# Patient Record
Sex: FEMALE | Race: WHITE
[De-identification: names, ages, dates, MRNs, and addresses within clinical notes are randomized per-mention and may not be internally consistent; named-entity substitution may affect disease eponyms.]

---

## 2023-01-12 ENCOUNTER — HOSPITAL ENCOUNTER (EMERGENCY)
Dept: HOSPITAL 76 - ED | Age: 83
Discharge: HOME | End: 2023-01-12
Payer: COMMERCIAL

## 2023-01-12 VITALS — DIASTOLIC BLOOD PRESSURE: 94 MMHG | SYSTOLIC BLOOD PRESSURE: 135 MMHG

## 2023-01-12 DIAGNOSIS — M16.11: ICD-10-CM

## 2023-01-12 DIAGNOSIS — M77.32: ICD-10-CM

## 2023-01-12 DIAGNOSIS — M72.2: Primary | ICD-10-CM

## 2023-01-12 PROCEDURE — 73630 X-RAY EXAM OF FOOT: CPT

## 2023-01-12 PROCEDURE — 99283 EMERGENCY DEPT VISIT LOW MDM: CPT

## 2023-01-12 NOTE — XRAY REPORT
PROCEDURE:  Foot 3 View LT

 

INDICATIONS:  pain, worsening, no specific injury

 

TECHNIQUE:  3 views of the foot were acquired.  

 

COMPARISON:  None.

 

FINDINGS:  

 

Bones:  No fractures or dislocations. Small calcaneal spurs. Mild degenerative change in the midfoot.
 Mild to moderate degenerative change in the forefoot. No suspicious bony lesions.  

 

Soft tissues:  No tibiotalar joint effusion.  Achilles tendon appears normal.  

 

IMPRESSION:  

Mild to moderate degenerative change.

 

Reviewed by: Regan Obando MD on 1/12/2023 10:45 AM PST

Approved by: Regan Obando MD on 1/12/2023 10:45 AM [facility]

 

 

Station ID:  [id number]

## 2023-01-12 NOTE — ED PHYSICIAN DOCUMENTATION
History of Present Illness





- Stated complaint


Stated Complaint: FOOT PX





- Chief complaint


Chief Complaint: General





- History obtained from


History obtained from: Patient





- Additonal information


Additional information: 


The patient comes to the emergency department with chief complaints of foot pain

increasingly over the last couple of months.  She has not sought any medical 

care for this previously.  She denies any injury.  The patient states that she 

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 

hip as well, but does not think that she favors that side.  She states that she 

has never had to wear orthotics and has never been diagnosed with any other 

condition of the foot.  The patient states that she was just visiting her 

daughter in [place] and they did do a lot of walking around for a week.  She 

came home a few days ago and last night, noticed that her foot pain was 

especially bad.  She feels the pain most strongly in her heel, on the plantar 

surface.  She is not sure if there is any specific movement that makes the pain 

worse, such as stepping down or pushing off.  She cannot think of anything that 

would have triggered this otherwise.  The patient has not been taking any 

medication for the discomfort.  She has been taking some herbal remedies and 

other supplements but that is it. She does have a prior diagnosis of osteopenia 

and takes a supplement for this, as well.  No other complaints at this time.








Review of Systems


Constitutional: reports: Reviewed and negative


Eyes: reports: Reviewed and negative


Ears: reports: Reviewed and negative


Nose: reports: Reviewed and negative


Throat: reports: Reviewed and negative


Cardiac: reports: Reviewed and negative


Respiratory: reports: Reviewed and negative


GI: reports: Reviewed and negative


: reports: Reviewed and negative


Skin: reports: Reviewed and negative


Musculoskeletal: reports: Extremity pain, Pain with weight bearing


Neurologic: reports: Reviewed and negative


Psychiatric: reports: Reviewed and negative


Endocrine: reports: Reviewed and negative


Immunocompromised: reports: Reviewed and negative





PD PAST MEDICAL HISTORY





- Present Medications


Home Medications: 


                                Ambulatory Orders











 Medication  Instructions  Recorded  Confirmed


 


predniSONE [Deltasone] 10 mg PO [id number] #42 tab 01/12/23 














- Allergies


Allergies/Adverse Reactions: 


                                    Allergies











Allergy/AdvReac Type Severity Reaction Status Date / Time


 


Sulfa (Sulfonamide Allergy  Unknown Verified 01/12/23 09:56





Antibiotics)     














PD ED PE NORMAL





- Vitals


Vital signs reviewed: Yes





- General


General: Alert and oriented X 3, No acute distress, Well developed/nourished





- HEENT


HEENT: Atraumatic, PERRL, EOMI, Moist mucous membranes





- Neck


Neck: Supple, no meningeal sign





- Cardiac


Cardiac: Strong equal pulses





- Respiratory


Respiratory: No respiratory distress





- Derm


Derm: Normal color, Warm and dry, No rash





- Extremities


Extremities: No deformity, No edema, Other (Mild tenderness to palpation along 

the attachments of the plantar fascia.  No other specific or point tenderness.  

No deformity of the foot.  No tenderness over the Achilles attachment.)





- Neuro


Neuro: No motor deficit, No sensory deficit





- Psych


Psych: Normal mood, Normal affect





Results





- Vitals


Vitals: 





                               Vital Signs - 24 hr











  01/12/23





  09:52


 


Temperature 36.5 C


 


Heart Rate 98


 


Respiratory 18





Rate 


 


Blood Pressure 145/86 H


 


O2 Saturation 97








                                     Oxygen











O2 Source                      Room air

















- Rads (name of study)


  ** Left foot x-ray


Radiology: Final report received, See rad report (Bone spurs on the calcaneus; 

mild to moderate degenerative changes in the mid and forefoot.)





PD Medical Decision Making





- ED course


Complexity details: reviewed results, re-evaluated patient, considered 

differential, d/w patient, d/w family


ED course: 


I discussed with the patient that it is very important that she follows up these

 chronic issues with her primary care physician.  I am not clear exactly what is

 causing her foot pain, though given the findings of degenerative changes, 

calcaneal bone spurs, and on examination, tenderness at the fascial attachment 

points, I suspect that some combination of all these is to blame.  Furthermore, 

since the patient is known to have Severe right-sided hip arthritis which 

impacts her gait, the patient may also be stressing her left lower extremity 

more than the right and this could also be a contributor.  I recommended the 

following: We will start a course of steroids to help decrease the inflammation 

and discomfort in the patient's foot.  The patient may also take some ibuprofen 

as well.  I have also recommended an assistive device for ambulation to help 

unburden the foot and the immediate future.  The patient has crutches at home 

and states she can get a walker at the senior center.  Lastly, I have 

recommended the patient follow-up with podiatry to discuss further remedies, 

such as custom fitted orthotics or potentially, physical therapy follow-up to 

work on normalizing her gait so that she is not unduly stressing the left foot. 

 There is no further intervention or work-up that is indicated in the emergency 

department at this time.  We have discussed appropriate use of the emergency 

department and the need for follow-up with her primary doctor.








Departure





- Departure


Disposition: 01 Home, Self Care


Clinical Impression: 


 Plantar fasciitis of left foot, Bone spur of left foot





Osteoarthritis of foot, left


Qualifiers:


 Osteoarthritis type: unspecified Qualified Code(s): M19.072 - Primary 

osteoarthritis, left ankle and foot





Condition: Stable


Instructions:  Heel Pain, Plantar Fasciitis, Toe Extension


Follow-Up: 


Raj Recio DPM [Physician No Access] - 


Prescriptions: 


predniSONE [Deltasone] 10 mg PO [id number] #42 tab


Comments: 


Your x-ray shows some arthritis in your foot and bone spurs on your heel.  These

are most likely contributing to some of your discomfort.  Additionally, you have

some tenderness at the attachment points of your plantar fascia, the top of band

of soft tissue that helps stabilize your foot at the sole.  This can sometimes 

become inflamed as well, particularly in the area of the arches.  Lastly, given 

the chronic arthritic issues you have in your right hip, it is probable that you

are over burdening your left leg and foot somewhat and this could also be 

contributing.  Given that this is a chronic issue and not related to any injury,

and is likely to persist to a certain degree, it is very important that you set 

up outpatient evaluation and follow-up for this.  We would recommend following 

up with one of the podiatrist in [facility], as they are the most specialized in area 

of feet and would be best able to advise you as to orthotics or other 

interventions that may be helpful.  In the meantime, you may use crutches or a 

walker to help when you are walking to unburden that left foot.  Additionally, 

we have started you on a steroid course.  You have been given first dose here in

the emergency department and the remainder of the prescription has been 

electronically transmitted to [facility] in [place].

## 2023-03-18 ENCOUNTER — HOSPITAL ENCOUNTER (OUTPATIENT)
Dept: HOSPITAL 76 - DI.S | Age: 83
Discharge: HOME | End: 2023-03-18
Attending: PHYSICIAN ASSISTANT
Payer: MEDICARE

## 2023-03-18 DIAGNOSIS — S52.501A: Primary | ICD-10-CM

## 2023-03-18 NOTE — XRAY REPORT
PROCEDURE:  Wrist 3 View RT

 

INDICATIONS: RIGHT WRIST PAIN

 

TECHNIQUE:  3 views of the wrist were acquired.  

 

COMPARISON:  None

 

FINDINGS:  

 

Bones: Minimally foreshortened fracture of the distal radius.

 

Soft tissues: Soft tissue swelling over the wrist.

 

IMPRESSION:  

Distal radial fracture with mild foreshortening.

 

Reviewed by: Liam Mata MD on 3/18/2023 12:30 PM [facility]

Approved by: Liam Mata MD on 3/18/2023 12:30 PM [facility]

 

 

Station ID:  [id number]

## 2023-03-20 ENCOUNTER — HOSPITAL ENCOUNTER (EMERGENCY)
Dept: HOSPITAL 76 - ED | Age: 83
Discharge: HOME | End: 2023-03-20
Payer: MEDICARE

## 2023-03-20 ENCOUNTER — HOSPITAL ENCOUNTER (OUTPATIENT)
Dept: HOSPITAL 76 - EMS | Age: 83
Discharge: ANOTHER HOSPITAL | End: 2023-03-20
Payer: MEDICARE

## 2023-03-20 VITALS — SYSTOLIC BLOOD PRESSURE: 130 MMHG | DIASTOLIC BLOOD PRESSURE: 70 MMHG

## 2023-03-20 DIAGNOSIS — R41.0: Primary | ICD-10-CM

## 2023-03-20 DIAGNOSIS — R00.0: ICD-10-CM
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
## 2023-03-20 LAB
ALBUMIN DIAFP-MCNC: 4.4 G/DL (ref 3.2–5.5)
ALBUMIN/GLOB SERPL: 1.5 {RATIO} (ref 1–2.2)
ALP SERPL-CCNC: 61 IU/L (ref 42–121)
ALT SERPL W P-5'-P-CCNC: 15 IU/L (ref 10–60)
AMPHET UR QL SCN: NEGATIVE
ANION GAP SERPL CALCULATED.4IONS-SCNC: 7 MMOL/L (ref 6–13)
APAP SERPL-MCNC: < 10 UG/ML (ref 10–30)
AST SERPL W P-5'-P-CCNC: 23 IU/L (ref 10–42)
BARBITURATES UR QL SCN>300 NG/ML: NEGATIVE
BASOPHILS NFR BLD AUTO: 0.1 10^3/UL (ref 0–0.1)
BASOPHILS NFR BLD AUTO: 0.7 %
BENZODIAZ UR QL SCN: NEGATIVE
BILIRUB BLD-MCNC: 0.4 MG/DL (ref 0.2–1)
BUN SERPL-MCNC: 17 MG/DL (ref 6–20)
CALCIUM UR-MCNC: 9.2 MG/DL (ref 8.5–10.3)
CHLORIDE SERPL-SCNC: 107 MMOL/L (ref 101–111)
CLARITY UR REFRACT.AUTO: CLEAR
CO2 SERPL-SCNC: 25 MMOL/L (ref 21–32)
COCAINE UR-SCNC: NEGATIVE UMOL/L
CREAT SERPLBLD-SCNC: 0.9 MG/DL (ref 0.4–1)
EOSINOPHIL # BLD AUTO: 0 10^3/UL (ref 0–0.7)
EOSINOPHIL NFR BLD AUTO: 0.6 %
ERYTHROCYTE [DISTWIDTH] IN BLOOD BY AUTOMATED COUNT: 11.9 % (ref 12–15)
ETHANOL BLD-MCNC: < 5 MG/DL
GFRSERPLBLD MDRD-ARVRAT: 60 ML/MIN/{1.73_M2} (ref 89–?)
GLOBULIN SER-MCNC: 2.9 G/DL (ref 2.1–4.2)
GLUCOSE SERPL-MCNC: 105 MG/DL (ref 70–100)
GLUCOSE UR QL STRIP.AUTO: NEGATIVE MG/DL
HCT VFR BLD AUTO: 38.7 % (ref 37–47)
HGB UR QL STRIP: 12.6 G/DL (ref 12–16)
KETONES UR QL STRIP.AUTO: NEGATIVE MG/DL
LIPASE SERPL-CCNC: 60 U/L (ref 22–51)
LYMPHOCYTES # SPEC AUTO: 1.7 10^3/UL (ref 1.5–3.5)
LYMPHOCYTES NFR BLD AUTO: 25.4 %
MCH RBC QN AUTO: 29.7 PG (ref 27–31)
MCHC RBC AUTO-ENTMCNC: 32.6 G/DL (ref 32–36)
MCV RBC AUTO: 91.3 FL (ref 81–99)
METHADONE UR QL SCN: NEGATIVE
METHAMPHET UR QL SCN: NEGATIVE
MONOCYTES # BLD AUTO: 0.6 10^3/UL (ref 0–1)
MONOCYTES NFR BLD AUTO: 8.9 %
NEUTROPHILS # BLD AUTO: 4.3 10^3/UL (ref 1.5–6.6)
NEUTROPHILS # SNV AUTO: 6.8 X10^3/UL (ref 4.8–10.8)
NEUTROPHILS NFR BLD AUTO: 64.1 %
NITRITE UR QL STRIP.AUTO: NEGATIVE
NRBC # BLD AUTO: 0 /100WBC
NRBC # BLD AUTO: 0 X10^3/UL
OPIATES UR QL SCN: NEGATIVE
PDW BLD AUTO: 9.6 FL (ref 7.9–10.8)
PH UR STRIP.AUTO: 6.5 PH (ref 5–7.5)
PLATELET # BLD: 287 10^3/UL (ref 130–450)
POTASSIUM SERPL-SCNC: 4.2 MMOL/L (ref 3.5–5)
PROT SPEC-MCNC: 7.3 G/DL (ref 6.7–8.2)
PROT UR STRIP.AUTO-MCNC: NEGATIVE MG/DL
RBC # UR STRIP.AUTO: NEGATIVE /UL
RBC # URNS HPF: (no result) /HPF (ref 0–5)
RBC MAR: 4.24 10^6/UL (ref 4.2–5.4)
SALICYLATES SERPL-MCNC: < 6 MG/DL
SODIUM SERPLBLD-SCNC: 139 MMOL/L (ref 135–145)
SP GR UR STRIP.AUTO: 1.01 (ref 1–1.03)
SQUAMOUS URNS QL MICRO: (no result)
THC UR QL SCN: NEGATIVE
UROBILINOGEN UR QL STRIP.AUTO: (no result) E.U./DL
UROBILINOGEN UR STRIP.AUTO-MCNC: NEGATIVE MG/DL
VOLATILE DRUGS POS SERPL SCN: (no result)
WBC # UR MANUAL: (no result) /HPF (ref 0–5)

## 2023-03-20 PROCEDURE — 85025 COMPLETE CBC W/AUTO DIFF WBC: CPT

## 2023-03-20 PROCEDURE — 99284 EMERGENCY DEPT VISIT MOD MDM: CPT

## 2023-03-20 PROCEDURE — 80306 DRUG TEST PRSMV INSTRMNT: CPT

## 2023-03-20 PROCEDURE — 36415 COLL VENOUS BLD VENIPUNCTURE: CPT

## 2023-03-20 PROCEDURE — 81003 URINALYSIS AUTO W/O SCOPE: CPT

## 2023-03-20 PROCEDURE — 81001 URINALYSIS AUTO W/SCOPE: CPT

## 2023-03-20 PROCEDURE — 80307 DRUG TEST PRSMV CHEM ANLYZR: CPT

## 2023-03-20 PROCEDURE — 87086 URINE CULTURE/COLONY COUNT: CPT

## 2023-03-20 PROCEDURE — 84443 ASSAY THYROID STIM HORMONE: CPT

## 2023-03-20 PROCEDURE — 83690 ASSAY OF LIPASE: CPT

## 2023-03-20 PROCEDURE — 80053 COMPREHEN METABOLIC PANEL: CPT

## 2023-03-20 PROCEDURE — 80320 DRUG SCREEN QUANTALCOHOLS: CPT

## 2023-03-20 PROCEDURE — 99283 EMERGENCY DEPT VISIT LOW MDM: CPT

## 2023-03-20 PROCEDURE — 70450 CT HEAD/BRAIN W/O DYE: CPT

## 2023-03-20 PROCEDURE — 80329 ANALGESICS NON-OPIOID 1 OR 2: CPT

## 2023-03-20 NOTE — EXTERNAL MEDICAL SUMMARY RPT
Continuity of Care Document

                            Created on:2023



Patient:Kathy Zavala

Sex:Female

:1940

External Reference #:[id number]





Demographics







                          Address                   [address] [place] [address]

 

                          Phone                     Unavailable

 

                          Preferred Language        English

 

                          Marital Status            

 

                          Zoroastrian Affiliation     Unknown

 

                          Race                      White

 

                          Ethnic Group              Not  or 









Author







                          Organization              [facility]

 

                          [place]                    [place] [address]

 

                          Phone                     [phone]









Care Team Providers







                    Name                Role                Phone

 

                    Unavailable         Unavailable         Unavailable

 

                    Sol Horne, Rafael Unavailable         Unavailable

 

                    Amy Bojorquez, Ke   Unavailable         Unavailable









Allergies and Intolerances







                 date            description     facility        type

 

                 (no date)       SULFA           [facility]

ncillary  (unknown)



                                                [facility] [place] 







Encounters

No information.



Functional Status

No information.



Immunizations

No information.



Medications







                     date                description         facility

 

                     2023 00:00    prednisone          [facility]



                                                            [facility]

 

                     2023 00:00    prednisone          [facility]



                                                            [facility]

 

                     2023 00:00    turmeric-turmeric root extract  [facility]

 [facility]



                                                            [facility]

 

                     2023 00:00    turmeric-turmeric root extract  [facility]

 [facility]



                                                            [facility]

 

                     2023 00:00    prednisone          [facility]

[facility]



                                                            [facility]

 

                     2023 00:00    prednisone          [facility]



                                                            [facility]

 

                     2023 00:00    prednisone          [facility]

[facility]



                                                            [facility]

 

                     2023 00:00    prednisone          [facility]

[facility]



                                                            [facility]

 

                     2023 00:00    cholecalciferol (vitamin d3)  Walk[facility] C

[facility]



                                                            [facility]

 

                     2023 00:00    cholecalciferol (vitamin d3)  [facility]

[facility]



                                                            [facility]

 

                     2023 00:00    prednisone          [facility]

[facility]



                                                            [facility]

 

                     2023 00:00    prednisone          [facility]

[facility]



                                                            [facility]

 

                     2023 00:00    ibuprofen           [facility]

[facility]



                                                            [facility]

 

                     2023 00:00    ibuprofen           [facility]

[facility]



                                                            [facility]

 

                     2023 00:00    ibuprofen           [facility]

[facility]



                                                            [facility]

[place]

 

                     2023 00:00    ibuprofen           [facility]

[facility]



                                                            [facility]

 

                     2023 00:00    ibuprofen           [facility]

[facility]



                                                            [facility]

 

                     2023 00:00    ibuprofen           [facility]

[facility]



                                                            [facility]

 

                     2023 00:00    ibuprofen           [facility]

[facility]



                                                            [facility]

 

                     2023 00:00    ibuprofen           [facility]

[facility]



                                                            [facility]

[place]

 

                     2023 00:00    cholecalciferol (vitamin d3)  [facility]



                                                            [facility]

 

                     2023 00:00    cholecalciferol (vitamin d3)  [facility]



                                                            [facility]

 

                     2023 00:00    cholecalciferol (vitamin d3)  Walk-In C

[facility]



                                                            [facility]

 

                     2023 00:00    cholecalciferol (vitamin d3)  [facility]



                                                            [facility]

 

                     2023 00:00    turmeric-turmeric root extract  Walk[facility]In

 [facility]



                                                            [facility]

 

                     2023 00:00    turmeric-turmeric root extract  Walk[facility]In

 [facility]



                                                            [facility]

 

                     2023 00:00    turmeric-turmeric root extract  Walk[facility]In

 [facility]



                                                            [facility]

 

                     2023 00:00    turmeric-turmeric root extract  Walk[facility]In

 [facility]



                                                            [facility]







Problems







                     date                description         facility

 

                     2023 00:00    Fracture of distal end of right  Walk[facility]

n [facility] 

&



                                        radius              [facility]

 

                     2023 00:00    Fracture of distal end of right  [facility]

n [facility] 

&



                                        radius              [facility]

 

                     2023 00:00    Pain of right wrist  [facility]

[facility]



                                                            [facility]

[place]

 

                     2023 00:00    Pain of right wrist  [facility]



                                                            [facility] Services C

[place]

 

                     2023 00:00    Pain in joint involving forearm  Walk[facility]

n [facility] 

&



                                                            Anc[facility] Services [facility]

[place]

 

                     2023 00:00    Pain in joint involving forearm  Walk-[facility]

n [facility] 

&



                                                            Ancillary Services [facility]

[place]

 

                     2023 00:00    Pain in right wrist  [facility]



                                                            Ancillary Services C

[place]

 

                     2023 00:00    Pain in right wrist  [facility]



                                                            Ancillary Services C

[place]

 

                     2023 00:00   Unspecified fracture of the lower  Walk-

In [facility]

&



                                        end of right radius, initial Ancillary [facility]

ervices [place]



                                        encounter for closed fracture 

 

                     2023 00:00   Unspecified fracture of the lower  Walk-

In [facility]

&



                                        end of right radius, initial Ancillary [facility]

ervices [place]



                                        encounter for closed fracture 







Procedures







                     date                description         facility

 

                     2023 00:00    Visit Code Hold     [facility]



                                                            [facility]

 

                     2023 00:00    Visit Code Hold     [facility]



                                                            [facility] Services [facility]

 

                     2023 00:00    Cast Supplies, Short Arm Splint,  Walk-

In Clinic [facility]

&



                                        Adult (11 years +) fiberglass Ancillary 

Services [place]

 

                     2023 00:00    Cast Supplies, Short Arm Splint,  Walk-

In Clinic [facility]

&



                                        Adult (11 years +) fiberglass Ancillary 

Services [place]

 

                     2023 00:00    Short arm splint, material, adult  Walk

-In [facility]



                                                            [facility]

 

                     2023 00:00    Short arm splint, material, adult  Walk

-In [facility]



                                                            [facility] 

[place]







Results/Labs

No information.



Social History







                     date                description         facility

 

                     2023 00:00    Never smoker        [facility] 

Services



                                                            [place]

 

                     2023 00:00    Never smoker        [facility] 

Services



                                                            [place]







Vital Signs







                 date            measurement     value           units

 

                 2023 00:00  BMI             30.29           kg/m2

 

                 2023 00:00  BP_diastolic    71              mmHg

 

                 2023 00:00  BP_systolic     120             mmHg

 

                 2023 00:00  heart_rate      72              /min

 

                 2023 00:00  height_metric   157.48          cm

 

                 2023 00:00  height_standard  62              in

 

                 2023 00:00  respiration_rate  14              /min

 

                 2023 00:00  temperature_metric  36.39           C

 

                 2023 00:00  temperature_standard  97.5            F

 

                 2023 00:00  weight_metric   74.84           kg

 

                 2023 00:00  weight_standard  165             lb

## 2023-03-20 NOTE — ED PHYSICIAN DOCUMENTATION
PD HPI ALTERED MENTAL STATUS





- Stated complaint


Stated Complaint: FEMALE 





- Chief complaint


Chief Complaint: Neuro





- History obtained from


History obtained from: Patient





- History of Present Illness


Timing - onset: How many months ago (several)


Timing - duration: Months (several)


Timing - details: Intermittant


Quality / character: Confused


Associated symptoms: Other (fell and hit head 2 days ago).  No: Fever, Headache,

Stiff neck, Dyspnea, Cough, NVD, Urinary sx, General weakness, Focal weakness, 

Seizure activity, Syncope


Contributing factors: Recent injury (fell and hit head 2 days ago).  No: 

Anticoagulated, New medication, Recent illness, Substance abuse


Basline status: Alert and oriented X 3





- Additional information


Additional information: 





Patient states that she has had intermittent confusion over the past several 

months.  She feels like she may be developing dementia.  She has an appointment 

with her doctor tomorrow for this.  She states that she did not have any acute 

confusion today.  No fevers.  No chills.





Review of Systems


Constitutional: denies: Fever, Chills


GI: denies: Abdominal Pain, Vomiting, Diarrhea


Skin: denies: Rash


Musculoskeletal: denies: Neck pain, Back pain


Neurologic: denies: Headache





PD PAST MEDICAL HISTORY





- Past Medical History


Past Medical History: No





- Present Medications


Home Medications: 


                                Ambulatory Orders











 Medication  Instructions  Recorded  Confirmed


 


predniSONE [Deltasone] 10 mg PO [id number] #42 tab 01/12/23 














- Allergies


Allergies/Adverse Reactions: 


                                    Allergies











Allergy/AdvReac Type Severity Reaction Status Date / Time


 


Sulfa (Sulfonamide Allergy  Unknown Verified 03/20/23 16:41





Antibiotics)     














- Living Situation


Living Arrangement: reports: At home





- Social History


Does the pt drink ETOH?: No


Does the pt have substance abuse?: No





PD ED PE NORMAL





- Vitals


Vital signs reviewed: Yes





- General


General: Alert and oriented X 3, No acute distress, Well developed/nourished





- HEENT


HEENT: Atraumatic, PERRL, Ears normal, Moist mucous membranes, Pharynx benign





- Neck


Neck: Supple, no meningeal sign, No bony TTP





- Cardiac


Cardiac: RRR, Strong equal pulses





- Respiratory


Respiratory: No respiratory distress, Clear bilaterally





- Abdomen


Abdomen: Soft, Non tender, Non distended





- Back


Back: No CVA TTP, No spinal TTP





- Derm


Derm: Warm and dry





- Extremities


Extremities: Other (Patient is a right hand and wrist are in a fiberglass s

plint.  Neurovascular intact. Otherwise normal extremity exam)





- Neuro


Neuro: Alert and oriented X 3, CNs 2-12 intact, No motor deficit, No sensory 

deficit, Normal speech


Eye Opening: Spontaneous


Motor: Obeys Commands


Verbal: Oriented


GCS Score: 15





- Psych


Psych: Normal mood, Normal affect





Results





- Vitals


Vitals: 


                               Vital Signs - 24 hr











  03/20/23 03/20/23





  16:39 18:41


 


Temperature 36.6 C 36.8 C


 


Heart Rate 87 86


 


Respiratory 15 16





Rate  


 


Blood Pressure 144/66 H 130/70


 


O2 Saturation 98 98








                                     Oxygen











O2 Source                      Room air

















- Labs


Labs: 


                                Laboratory Tests











  03/20/23 03/20/23 03/20/23





  17:00 17:00 17:00


 


WBC  6.8  


 


RBC  4.24  


 


Hgb  12.6  


 


Hct  38.7  


 


MCV  91.3  


 


MCH  29.7  


 


MCHC  32.6  


 


RDW  11.9 L  


 


Plt Count  287  


 


MPV  9.6  


 


Neut # (Auto)  4.3  


 


Lymph # (Auto)  1.7  


 


Mono # (Auto)  0.6  


 


Eos # (Auto)  0.0  


 


Baso # (Auto)  0.1  


 


Absolute Nucleated RBC  0.00  


 


Nucleated RBC %  0.0  


 


Sodium   139 


 


Potassium   4.2 


 


Chloride   107 


 


Carbon Dioxide   25 


 


Anion Gap   7.0 


 


BUN   17 


 


Creatinine   0.9 


 


Estimated GFR (MDRD)   60 L 


 


Glucose   105 H 


 


Calcium   9.2 


 


Total Bilirubin   0.4 


 


AST   23 


 


ALT   15 


 


Alkaline Phosphatase   61 


 


Total Protein   7.3 


 


Albumin   4.4 


 


Globulin   2.9 


 


Albumin/Globulin Ratio   1.5 


 


Lipase   60 H 


 


TSH    2.78


 


Urine Color   


 


Urine Clarity   


 


Urine pH   


 


Ur Specific Gravity   


 


Urine Protein   


 


Urine Glucose (UA)   


 


Urine Ketones   


 


Urine Occult Blood   


 


Urine Nitrite   


 


Urine Bilirubin   


 


Urine Urobilinogen   


 


Ur Leukocyte Esterase   


 


Urine RBC   


 


Urine WBC   


 


Ur Squamous Epith Cells   


 


Urine Bacteria   


 


Ur Microscopic Review   


 


Urine Culture Comments   


 


Salicylates   < 6.0 


 


Urine Opiates Screen   


 


Ur Oxycodone Screen   


 


Urine Methadone Screen   


 


Ur Propoxyphene Screen   


 


Acetaminophen   < 10 L 


 


Ur Barbiturates Screen   


 


Ur Tricyclics Screen   


 


Ur Phencyclidine Scrn   


 


Ur Amphetamine Screen   


 


U Methamphetamines Scrn   


 


U Benzodiazepines Scrn   


 


Urine Cocaine Screen   


 


U Cannabinoids Screen   


 


Ethyl Alcohol   < 5.0 














  03/20/23





  17:28


 


WBC 


 


RBC 


 


Hgb 


 


Hct 


 


MCV 


 


MCH 


 


MCHC 


 


RDW 


 


Plt Count 


 


MPV 


 


Neut # (Auto) 


 


Lymph # (Auto) 


 


Mono # (Auto) 


 


Eos # (Auto) 


 


Baso # (Auto) 


 


Absolute Nucleated RBC 


 


Nucleated RBC % 


 


Sodium 


 


Potassium 


 


Chloride 


 


Carbon Dioxide 


 


Anion Gap 


 


BUN 


 


Creatinine 


 


Estimated GFR (MDRD) 


 


Glucose 


 


Calcium 


 


Total Bilirubin 


 


AST 


 


ALT 


 


Alkaline Phosphatase 


 


Total Protein 


 


Albumin 


 


Globulin 


 


Albumin/Globulin Ratio 


 


Lipase 


 


TSH 


 


Urine Color  YELLOW


 


Urine Clarity  CLEAR


 


Urine pH  6.5


 


Ur Specific Gravity  1.010


 


Urine Protein  NEGATIVE


 


Urine Glucose (UA)  NEGATIVE


 


Urine Ketones  NEGATIVE


 


Urine Occult Blood  NEGATIVE


 


Urine Nitrite  NEGATIVE


 


Urine Bilirubin  NEGATIVE


 


Urine Urobilinogen  0.2 (NORMAL)


 


Ur Leukocyte Esterase  SMALL H


 


Urine RBC  0-5


 


Urine WBC  0-3


 


Ur Squamous Epith Cells  MANY Squamous H


 


Urine Bacteria  Rare


 


Ur Microscopic Review  INDICATED


 


Urine Culture Comments  NOT INDICATED


 


Salicylates 


 


Urine Opiates Screen  NEGATIVE


 


Ur Oxycodone Screen  NEGATIVE


 


Urine Methadone Screen  NEGATIVE


 


Ur Propoxyphene Screen  NEGATIVE


 


Acetaminophen 


 


Ur Barbiturates Screen  NEGATIVE


 


Ur Tricyclics Screen  NEGATIVE


 


Ur Phencyclidine Scrn  NEGATIVE


 


Ur Amphetamine Screen  NEGATIVE


 


U Methamphetamines Scrn  NEGATIVE


 


U Benzodiazepines Scrn  NEGATIVE


 


Urine Cocaine Screen  NEGATIVE


 


U Cannabinoids Screen  NEGATIVE


 


Ethyl Alcohol 














- Rads (name of study)


  ** CT head


Relevant Findings:: Final report received, See rad report





PD Medical Decision Making





- ED course


Complexity details: reviewed results, re-evaluated patient, considered 

differential, d/w patient


ED course: 





No acute findings on head CT.  Patient does not have any confusion here.  Sounds

 more intermittent over the past several months.  No acute findings on CBC, 

chemistry, TSH, UA or tox that would explain her symptoms.  We will have her 

follow-up with her doctor for further care.  No indication for further testing 

at this time.  Patient counseled regarding signs and symptoms for which I 

believe and urgent re-evaluation would be necessary. Patient with good 

understanding of and agreement to plan and is comfortable going home at this 

time





This document was made in part using voice recognition software. While efforts 

are made to proofread this document, sound alike and grammatical errors may 

occur.





Departure





- Departure


Disposition: 01 Home, Self Care


Clinical Impression: 


 Confusion





Condition: Good


Instructions:  ED Confusion


Follow-Up: 


RETA EID MD [Primary Care Provider] - Tomorrow


Comments: 


Please follow-up with your doctor tomorrow as scheduled.  Your laboratory 

testing, urinalysis and head CT did not show any acute abnormalities today.  

Please return if you worsen.


Discharge Date/Time: 03/20/23 18:56

## 2023-03-20 NOTE — CT REPORT
PROCEDURE:  HEAD WO

 

INDICATIONS:  fall, head injury

 

TECHNIQUE:  

Noncontrast 4.5 mm thick angled axial sections acquired from the foramen magnum to the vertex.  For r
adiation dose reduction, the following was used:  automated exposure control, adjustment of mA and/or
 kV according to patient size.

 

COMPARISON:  None.

 

FINDINGS:  

Image quality:  There is streak artifact seen through the skull base.  

 

CSF spaces:  Basal cisterns are patent.  No extra-axial fluid collections.  Ventricles are normal in 
size and shape.  

 

Brain:  No midline shift.  No intracranial masses or hemorrhage.  Gray-white matter interface is norm
al.  

 

Skull and face:  Calvarium and visualized facial bones are intact, without suspicious lesions.  Hyper
ostosis frontalis is incidentally noted, which is not frankly abnormal for a female patient of this a
ge.  

 

Sinuses:  Visualized sinuses and mastoids are clear.  

 

 

 

 

IMPRESSION:  

 

No intracranial hemorrhage is seen.   

 

No significant intracranial abnormality is seen.  

 

Reviewed by: Nicholas Scales MD on 3/20/2023 5:18 PM AKDT

Approved by: Nicholas Scales MD on 3/20/2023 5:18 PM AKDT

 

 

Station ID:  [id number]